# Patient Record
Sex: FEMALE | Race: BLACK OR AFRICAN AMERICAN
[De-identification: names, ages, dates, MRNs, and addresses within clinical notes are randomized per-mention and may not be internally consistent; named-entity substitution may affect disease eponyms.]

---

## 2017-08-29 ENCOUNTER — HOSPITAL ENCOUNTER (OUTPATIENT)
Dept: HOSPITAL 61 - MAMMO | Age: 72
Discharge: HOME | End: 2017-08-29
Attending: INTERNAL MEDICINE
Payer: MEDICARE

## 2017-08-29 DIAGNOSIS — Z12.31: Primary | ICD-10-CM

## 2017-08-29 NOTE — RAD
DATE: 8/29/2017



EXAM: DIGITAL SCREEN BILAT W/CAD



HISTORY: Screening study.



COMPARISON: 10/8/2015



This study was interpreted with the benefit of Computerized Aided Detection

(CAD).





The breast parenchyma shows scattered fibroglandular densities. Breast

parenchyma level B.





FINDINGS: Digital MLO and CC mammograms of both breasts were obtained.

Comparison study is dated 10/8/2015. The breast parenchyma is composed of

scattered fibroglandular densities which can obscure a lesion on mammography

(breast density code B). Benign-appearing calcifications are seen within both

breasts. The well-defined mass seen within the left breast the previous

examination is no longer visualized. This is consistent with a resolved cyst.

No spiculated mass is seen. No malignant appearing calcification is noted.  





IMPRESSION: BI-RADS Category 1, negative. There is no mammographic evidence of

malignancy. Routine yearly screening mammography is recommended for follow-up.







BI-RADS CATEGORY: 1 NEGATIVE



RECOMMENDED FOLLOW-UP: 12M 12 MONTH FOLLOW-UP



PQRS compliance statement: Patient information was entered into a reminder

system with a target due date 8/29/2018 for the next mammogram.



Mammography is a sensitive method for finding small breast cancers, but it

does not detect them all and is not a substitute for careful clinical

examination.  A negative mammogram does not negate a clinically suspicious

finding and should not result in delay in biopsying a clinically suspicious

abnormality.



"Our facility is accredited by the [employer] Mammography

Program."

## 2018-09-13 ENCOUNTER — HOSPITAL ENCOUNTER (OUTPATIENT)
Dept: HOSPITAL 61 - MAMMO | Age: 73
Discharge: HOME | End: 2018-09-13
Attending: INTERNAL MEDICINE
Payer: MEDICARE

## 2018-09-13 DIAGNOSIS — Z12.31: Primary | ICD-10-CM

## 2018-09-13 PROCEDURE — 77067 SCR MAMMO BI INCL CAD: CPT

## 2018-09-13 PROCEDURE — 77063 BREAST TOMOSYNTHESIS BI: CPT

## 2018-09-13 NOTE — RAD
DATE: 9/13/2018



EXAM: MAMMO JUANITA SCREENING BILATERAL



HISTORY: Routine screening



COMPARISON: 8/29/2017



This study was interpreted with the benefit of Computerized Aided Detection

(CAD).





Breast Density: SCATTERED The breast parenchyma shows scattered fibroglandular

densities. Breast parenchyma level B.





FINDINGS: 2-D and 3-D tomosynthesis imaging was performed in CC and MLO

projections.  The fibroglandular tissues are heterogeneous in a multinodular

pattern.  No spiculated mass or architectural distortion is seen.  Scattered

benign type calcifications are present.  No suspicious microcalcifications

have developed.  





IMPRESSION: Stable mammograms without evidence of malignancy.







BI-RADS CATEGORY: 2 BENIGN FINDING(S)



RECOMMENDED FOLLOW-UP: 12M 12 MONTH FOLLOW-UP



PQRS compliance statement: Patient information was entered into a reminder

system with a target due date     for the next mammogram.



Mammography is a sensitive method for finding small breast cancers, but it

does not detect them all and is not a substitute for careful clinical

examination.  A negative mammogram does not negate a clinically suspicious

finding and should not result in delay in biopsying a clinically suspicious

abnormality.



"Our facility is accredited by the [employer] Mammography

Program."

## 2021-10-14 ENCOUNTER — HOSPITAL ENCOUNTER (OUTPATIENT)
Dept: HOSPITAL 61 - MAMMO | Age: 76
End: 2021-10-14
Attending: INTERNAL MEDICINE
Payer: MEDICARE

## 2021-10-14 DIAGNOSIS — Z12.31: Primary | ICD-10-CM

## 2021-10-14 PROCEDURE — 77063 BREAST TOMOSYNTHESIS BI: CPT

## 2021-10-14 PROCEDURE — 77067 SCR MAMMO BI INCL CAD: CPT

## 2021-10-14 NOTE — RAD
Bilateral screening mammogram dated 10/14/2021.



INDICATION: 76 years of age asymptomatic female patient presents for screening mammography. .



TECHNIQUE:  Full field craniocaudal and mediolateral oblique images of both breasts were obtained usi
ng digital technique with tomosynthesis and also analyzed with computer-aided detection software. .



COMPARISON: 9/13/2018, 8/29/2017  ..



BREAST COMPOSITION: Category B: There are scattered fibroglandular densities.



FINDINGS:

No suspicious mass or clustered microcalcification. There are circumscribed small nodular foci scatte
red throughout both breasts, unchanged. Scattered benign-appearing calcifications, unchanged. No arch
itectural distortion.





IMPRESSION: Stable bilateral mammogram. 



RECOMMENDATION: Annual screening mammography is recommended, unless clinically indicated sooner based
 on symptoms or change in physical exam.



BIRADS 2: BENIGN 



This study was interpreted with the benefit of Computerized Aided Detection (CAD).



Recommend follow-up screening mammogram in one year.



Patient information is entered into the reminder system with a target due date for the next screening
 mammogram.



Mammography is the most sensitive method for finding small breast cancers, but it does not detect the
m all and is not a substitute for careful clinical examination. A negative mammogram does not negate 
a clinically suspicious finding and should not result in delay in biopsying a clinically suspicious a
bnormality.



 "Our facility is accredited by the [employer] Mammography Program."



Electronically signed by: Gene Garza MD (10/14/2021 11:40 AM) [id number]